# Patient Record
Sex: MALE | Race: BLACK OR AFRICAN AMERICAN | URBAN - METROPOLITAN AREA
[De-identification: names, ages, dates, MRNs, and addresses within clinical notes are randomized per-mention and may not be internally consistent; named-entity substitution may affect disease eponyms.]

---

## 2021-01-18 ENCOUNTER — INPATIENT HOSPITAL (OUTPATIENT)
Dept: URBAN - METROPOLITAN AREA MEDICAL CENTER 9 | Facility: MEDICAL CENTER | Age: 66
End: 2021-01-18

## 2021-01-18 DIAGNOSIS — I69.391 DYSPHAGIA FOLLOWING CEREBRAL INFARCTION: ICD-10-CM

## 2021-01-18 DIAGNOSIS — R63.3 FEEDING DIFFICULTIES: ICD-10-CM

## 2021-01-18 PROCEDURE — 99222 1ST HOSP IP/OBS MODERATE 55: CPT | Performed by: SPECIALIST

## 2021-01-19 ENCOUNTER — INPATIENT HOSPITAL (OUTPATIENT)
Dept: URBAN - METROPOLITAN AREA MEDICAL CENTER 9 | Facility: MEDICAL CENTER | Age: 66
End: 2021-01-19

## 2021-01-19 DIAGNOSIS — I69.391 DYSPHAGIA FOLLOWING CEREBRAL INFARCTION: ICD-10-CM

## 2021-01-19 DIAGNOSIS — R63.3 FEEDING DIFFICULTIES: ICD-10-CM

## 2021-01-19 PROCEDURE — 43246 EGD PLACE GASTROSTOMY TUBE: CPT | Performed by: INTERNAL MEDICINE

## 2021-01-20 PROCEDURE — 99232 SBSQ HOSP IP/OBS MODERATE 35: CPT | Performed by: SPECIALIST

## 2021-04-20 ENCOUNTER — OFFICE (OUTPATIENT)
Dept: URBAN - METROPOLITAN AREA CLINIC 106 | Facility: CLINIC | Age: 66
End: 2021-04-20
Payer: COMMERCIAL

## 2021-04-20 VITALS
TEMPERATURE: 97.4 F | DIASTOLIC BLOOD PRESSURE: 76 MMHG | HEART RATE: 104 BPM | WEIGHT: 134 LBS | HEIGHT: 67 IN | SYSTOLIC BLOOD PRESSURE: 150 MMHG

## 2021-04-20 DIAGNOSIS — R63.3 FEEDING DIFFICULTIES: ICD-10-CM

## 2021-04-20 DIAGNOSIS — R13.12 DYSPHAGIA, OROPHARYNGEAL PHASE: ICD-10-CM

## 2021-04-20 PROCEDURE — 99214 OFFICE O/P EST MOD 30 MIN: CPT | Performed by: INTERNAL MEDICINE

## 2021-04-20 NOTE — SERVICENOTES
PEG tube removed without difficulty in the office today - I dressed the site with a dry gauze bandage and instructed him to leave it in place for 24hrs. At that point, he can remove the bandage and keep it uncovered. 
For now, I will have him return for follow-up as needed.

## 2021-04-20 NOTE — SERVICEHPINOTES
The patient is seen today in follow-up after a [place] hospitalization.He underwent an EGD with PEG placement on 1/19/21 secondary to feeding difficulties/oropharyngeal dysphagia in the setting of an acute CVA. His EGD was overall unremarkable and a 20F PEG tube was placed and secured at 3cm with the skin. A CBC done on 1/22/21 was remarkable for a normocytic anemia with a Hgb 8.3 and normal plt count at 199. On 1/19/21, his liver enzymes were remarkable for an  and  - his INR was normal at 1.07.BRA contrasted CT of the abd/pelvis done on 1/18/21 was remarkable for a suspected right lower lobe pneumonia, mural thickening of the proximal duodenum and severe iliofemoral atherosclerotic disease.Today, he returns with his wife to have his PEG tube removed - he and his wife report that he has not used his PEG tube in over a month and currently all of his feedings and medications are by mouth. By report, he also had a normal VFSS done through his rehab facility and that report is forthcoming. BR